# Patient Record
(demographics unavailable — no encounter records)

---

## 2024-11-19 NOTE — PHYSICAL EXAM
[Normocephalic] : normocephalic [No Supraclavicular Adenopathy] : no supraclavicular adenopathy [Examined in the supine and seated position] : examined in the supine and seated position [Symmetrical] : symmetrical [No dominant masses] : no dominant masses in right breast  [No dominant masses] : no dominant masses left breast [No Nipple Retraction] : no left nipple retraction [No Nipple Discharge] : no left nipple discharge [No Axillary Lymphadenopathy] : no left axillary lymphadenopathy [No Edema] : no edema [No Rashes] : no rashes [No Ulceration] : no ulceration [No Cervical Adenopathy] : no cervical adenopathy [Breast Nipple Inversion] : nipples not inverted [Breast Nipple Retraction] : nipples not retracted [Breast Nipple Flattening] : nipples not flattened [Breast Nipple Fissures] : nipples not fissured

## 2024-11-19 NOTE — ASSESSMENT
[FreeTextEntry1] : 32 yo female presents for follow-up, abnormal imaging and high risk breast cancer screening. She is overall doing well, no breast complaints today, and exam is WNL.  Reviewed [facility] high risk screening guidelines including the recommendation for biannual radiological screening exams with a mammogram and screening MRI. Reviewed the benign results of her recent MRI. Rufino reviewed the results from of mammogram and US in June and again this month, which recommends continued follow-up in 6 months for probably benign findings bilaterally. This will be due at the time of her annual screening mammo/US in June 2025.   The patient plans to establish care with a new GYN in the new year; encouraged her to make the appointment for the Spring/early Summer, so that breast exams can be alternated with her GYN.   If mammo/US in June is benign and breast exam with GYN is WNL, will plan on seeing her in 1 year, after annual high risk screening MRI, in November 2025.    Discussed the importance of breast self awareness. Encouraged the patient to become familiar with her tissue so as to be aware of any changes from her baseline.  All questions were answered; the patient verbalized understanding and is in agreement with the plan.

## 2024-11-19 NOTE — PAST MEDICAL HISTORY
[Menstruating] : The patient is menstruating [Menarche Age ____] : age at menarche was [unfilled] [Definite ___ (Date)] : the last menstrual period was [unfilled] [Regular Cycle Intervals] : have been regular [History of Hormone Replacement Treatment] : has no history of hormone replacement treatment

## 2024-11-19 NOTE — PLAN
[TextEntry] : B/l screening mammo, left diag views and b/l US in June 2025 Breast exam with GYN  MRI in 1 year, RTC after or sooner pending above

## 2024-11-19 NOTE — HISTORY OF PRESENT ILLNESS
[FreeTextEntry1] : Adeline is a 32 yo female who presents for follow-up, high risk screening & follow-up of abnormal imaging. Pt denies palpable masses, skin lesions/changes, nipple discharge, or other breast complaints.  ESAU lifetime risk is 35.8% the patient's mother was diagnosed with age 26; paternal grandmother with breast cancer in her 80's.   She underwent genetic testing with the genetic counselor earlier this year, results were negative for any pathogenic mutations or variants of unknown significance

## 2024-11-19 NOTE — DATA REVIEWED
[FreeTextEntry1] : 6/14/24 ([facility]) b/l mammo and US screening: scattered areas of fibroglandular density. 1. Superior left breast oval asymmetry is indeterminate. Recommend further evaluation with additional left diagnostic mammogram views and left targeted ultrasound. 2. Slightly superior left breast asymmetry with associated questioned architectural distortion is indeterminate. Recommend further evaluation with additional left diagnostic mammogram views and left targeted ultrasound. 3. Probably benign right 12:00 0.7 cm circumscribed oval hypoechoic mass. Six-month follow-up right targeted ultrasound is recommended. RECOMMENDATION: Additional Imaging. BI-RADS 0  6/25/24 ([facility]) left diag mammo and US: Recommend short-term interval follow-up of probable island of glandular tissue and adjacent probable intramammary lymph node in the left breast, to include spot compression/lateral tomosynthesis views. BI-RADS 3.  11/11/24 ([facility]) MRI: No suspicious enhancement to warrant biopsy. BI-RADS 2  11/11/24 ([facility]) right US and left diag mammo: scattered areas of fibroglandular density. Recommend short-term interval sonographic follow-up of benign-appearing nodule in the right breast at 12:00 3 cm from the nipple, which can be performed at time of next annual mammogram, due in 6/2025. Recommend short-term interval mammographic follow-up of a nodule in the left upper outer quadrant, to optimally document long-term stability, which can be performed at time of next annual mammogram, due in 6/2025. BI-RADS 3.